# Patient Record
Sex: FEMALE | Race: OTHER | Employment: PART TIME | URBAN - METROPOLITAN AREA
[De-identification: names, ages, dates, MRNs, and addresses within clinical notes are randomized per-mention and may not be internally consistent; named-entity substitution may affect disease eponyms.]

---

## 2023-02-06 ENCOUNTER — OFFICE VISIT (OUTPATIENT)
Dept: SURGERY | Age: 28
End: 2023-02-06
Payer: SUBSIDIZED

## 2023-02-06 VITALS
DIASTOLIC BLOOD PRESSURE: 66 MMHG | HEART RATE: 77 BPM | BODY MASS INDEX: 27.6 KG/M2 | OXYGEN SATURATION: 98 % | SYSTOLIC BLOOD PRESSURE: 111 MMHG | TEMPERATURE: 98.1 F | HEIGHT: 62 IN | RESPIRATION RATE: 16 BRPM | WEIGHT: 150 LBS

## 2023-02-06 DIAGNOSIS — K64.0 GRADE I HEMORRHOIDS: ICD-10-CM

## 2023-02-06 PROCEDURE — 99202 OFFICE O/P NEW SF 15 MIN: CPT | Performed by: SURGERY

## 2023-02-06 NOTE — PROGRESS NOTES
Basil Wilkinson is a 32 y.o. female who is referred by Dr. Arlyn Malin for further evaluation of hemorrhoids. Ms. Oseas Snata tells me that she has been having problems with hemorrhoids for approximately three year now. Ms. Oseas Santa reports perianal pain when constipated. Associated rectal bleeding. No family h/o colorectal cancer. Colonoscopy in 2019 was unrevealing. (By report) Started on [address] [place] [address] in December, 2022 for chronic constipation. She has otherwise been in her usual state of health. Past Medical History:   Diagnosis Date    Grade I hemorrhoids 2/6/2023    History of varicella 01/01/2003    Stool color black 2019     Past Surgical History:   Procedure Laterality Date    HX COLONOSCOPY  2019     History reviewed. No pertinent family history. Social History     Socioeconomic History    Marital status: SINGLE   Tobacco Use    Smoking status: Never    Smokeless tobacco: Never   Substance and Sexual Activity    Alcohol use: No    Drug use: No    Sexual activity: Yes     Partners: Female     Birth control/protection: None     Social Determinants of Health     Food Insecurity: No Food Insecurity    Worried About Running Out of Food in the Last Year: Never true    Ran Out of Food in the Last Year: Never true   Housing Stability: Low Risk     Unable to Pay for Housing in the Last Year: No    Number of Places Lived in the Last Year: 1    Unstable Housing in the Last Year: No     Review of systems negative except as noted. Review of Systems   Gastrointestinal:  Positive for blood in stool and constipation. Perianal pain when constipated. Physical Exam  Vitals reviewed. Exam conducted with a chaperone present. Constitutional:       General: She is not in acute distress. Appearance: Normal appearance. She is normal weight. HENT:      Head: Normocephalic and atraumatic. Eyes:      General: No scleral icterus.   Cardiovascular:      Rate and Rhythm: Normal rate and regular rhythm. Pulmonary:      Effort: Pulmonary effort is normal.      Breath sounds: Normal breath sounds. Abdominal:      General: There is no distension. Palpations: Abdomen is soft. Tenderness: There is no abdominal tenderness. Genitourinary:     Comments: No apparent fistula-in-ano or perianal abscess. No external hemorrhoids. No prolapsed internal hemorrhoids. On anoscopy, small internal hemorrhoids which are not bleeding. Musculoskeletal:         General: Normal range of motion. Cervical back: Neck supple. Lymphadenopathy:      Cervical: No cervical adenopathy. Neurological:      General: No focal deficit present. Mental Status: She is alert. ASSESSMENT and PLAN  Ms. Jono Palacios is a 31 yo female with internal hemorrhoids. In view of the findings on H and P, do not believe that there is an indication for surgical intervention at this time. Suggested to Ms. Jono Palacios that she increase water and fiber in diet. Linzess as prescribed. Also suggested that she try Miralax as needed for constipation. Will see in four more weeks or earlier if need be. Discussed plan with Ms. Jono Palacios and she is agreeable.       CC: Livier Goode MD

## 2023-02-06 NOTE — PROGRESS NOTES
Identified pt with two pt identifiers (name and ). Reviewed chart in preparation for visit and have obtained necessary documentation. Daren Rivera is a 32 y.o. female  Chief Complaint   Patient presents with    Hemorrhoids     Visit Vitals  /66 (BP 1 Location: Right arm, BP Patient Position: Sitting, BP Cuff Size: Large adult)   Pulse 77   Temp 98.1 °F (36.7 °C) (Oral)   Resp 16   Ht 5' 2.2\" (1.58 m)   Wt 150 lb (68 kg)   SpO2 98%   BMI 27.26 kg/m²       1. Have you been to the ER, urgent care clinic since your last visit? Hospitalized since your last visit? No    2. Have you seen or consulted any other health care providers outside of the [address] [place] [address] since your last visit? Include any pap smears or colon screening.  No

## 2023-02-21 ENCOUNTER — DOCUMENTATION ONLY (OUTPATIENT)
Dept: FAMILY MEDICINE CLINIC | Age: 28
End: 2023-02-21

## 2023-02-21 NOTE — PROGRESS NOTES
Patient's PAP application for Linzess was approved for 12 months. Her first shipment of 6 bottles of Linzess 145mcg, 30 caps/bottle, was delivered today to the [address] [place] [address] office. Routing to [address] [place], [place] [address]CHARLEE for dose confirmation and permission to deliver to the patient. Jane Diane RN    naCLOtide (Linzess) 145 mcg cap capsule [[id number]]     Order Details  Dose: 290 mcg Route: Oral Frequency: DAILY BEFORE BREAKFAST   Dispense Quantity: 180 Capsule Refills: 3          Sig: Take 2 Capsules by mouth Daily (before breakfast) for 360 days.  Via PAP   Patient not taking: Reported on 2/6/2023         Start Date: 01/04/23 End Date: 12/30/23 after 360 doses   Written Date: 01/04/23 Expiration Date: --       Diagnosis Association: Chronic constipation (K59.09)   Original Order:  linaCLOtide (Linzess) 145 mcg cap capsule [[id number]]   Providers    Authorizing Provider:    Lanny Hartley [address] [place] [id number]   Phone:  [phone]   Fax:  [phone]   JOSEPH #:  [id number]   NPI:  [id number]        Ordering User:  Lanny Hartley

## 2023-02-22 NOTE — PROGRESS NOTES
Terrie to deliver [address] and use as directed.    Routing to PAP coordinator to process the delivery, thank you

## 2023-02-24 NOTE — PROGRESS NOTES
Pt arrived to [facility] for medication . She confirmed her name and . This is a new medication for the patient. I reviewed dosage instructions (take 2 Capsules by mouth Daily (before breakfast) for 360 days) with her and she correctly verbalized in return. I briefly reviewed mechanism of action as well as potential side effects. Pt said that her constipation has been doing a little better and asked if she needs to take the medication every day. I told her that, as far as I know, this medication is not typically described as a PRN medication. I encouraged her to take it every day as prescribed. I also told her that she should call us if she experiences uncomfortable side effects so that the provider can re-evaluate. I allowed time for more questions and pt verbalized understanding to all given instructions. Medication (6 bottles of Linzess 145mcg, 30 caps/bottle) dispensed to pt.

## 2023-02-24 NOTE — PROGRESS NOTES
Called pt, confirmed name and . She stated she will come to [facility] today 23 to  her Linzess. I texted her the address.

## 2023-04-04 ENCOUNTER — TELEPHONE (OUTPATIENT)
Dept: FAMILY MEDICINE CLINIC | Age: 28
End: 2023-04-04

## 2023-04-04 NOTE — TELEPHONE ENCOUNTER
The pt called and left a message on nurse line. She is having side effects to her Linzess medication. She started the medication and had the side effects marv she was told she would have of diarrhea. The diarrhea did not stop after 4 th week she was still continujng to have diarrhea watery loose stools. She would take the medication and wait as it said to eat then eat and the watery stools would start and continue until around 2pm. She had to stop the medication. The pt staopped the Linzess she thinks last Fri 03/31/23. She is requesting an appt with Dr Calixto Gabriel or medication change or if the Dr could call her. I told the pt I would send the provider the message and get back with her with the message from the Dr. This medication is a PAP med, so I routed it to the PAP nurse as well.  Guille Etienne RN

## 2023-06-01 ENCOUNTER — TELEPHONE (OUTPATIENT)
Age: 28
End: 2023-06-01